# Patient Record
Sex: FEMALE | URBAN - METROPOLITAN AREA
[De-identification: names, ages, dates, MRNs, and addresses within clinical notes are randomized per-mention and may not be internally consistent; named-entity substitution may affect disease eponyms.]

---

## 2020-08-13 ENCOUNTER — APPOINTMENT (RX ONLY)
Dept: URBAN - METROPOLITAN AREA CLINIC 12 | Facility: CLINIC | Age: 35
Setting detail: DERMATOLOGY
End: 2020-08-13

## 2020-08-13 VITALS — TEMPERATURE: 97.9 F

## 2020-08-13 DIAGNOSIS — L98.8 OTHER SPECIFIED DISORDERS OF THE SKIN AND SUBCUTANEOUS TISSUE: ICD-10-CM

## 2020-08-13 PROCEDURE — ? BOTOX

## 2020-08-13 NOTE — PROCEDURE: BOTOX
Expiration Date (Month Year): 04/2023
Right Periorbital Units: 0
Show Nasal Units: Yes
Additional Area 2 Location: Chin
Additional Area 6 Location: [facility]
Forehead Units: 8
Additional Area 4 Location: nasalis
Show Lcl Units: No
Price (Use Numbers Only, No Special Characters Or $): 192.00
Additional Area 1 Location: Lateral eyebrows
consent obtained. Risks include but not limited to lid/brow ptosis, bruising, swelling, diplopia, temporary effect, incomplete chemical denervation.
Glabellar Complex Units: 16
Additional Area 3 Location: lips
Dilution (U/0.1 Cc): 2.5
Lot #: [id number]
Reconstitution Date (Optional): 08/13/2020
Post-Care Instructions: Patient instructed to not lie down for 4 hours and limit physical activity for 24 hours. Patient instructed not to travel by airplane for 48 hours.\\n\\n8 per unit (friends and family discount) x 24 = $192.00
Detail Level: Simple

## 2021-08-31 ENCOUNTER — APPOINTMENT (RX ONLY)
Dept: URBAN - METROPOLITAN AREA CLINIC 12 | Facility: CLINIC | Age: 36
Setting detail: DERMATOLOGY
End: 2021-08-31

## 2021-08-31 PROBLEM — N63.0 UNSPECIFIED LUMP IN UNSPECIFIED BREAST: Status: ACTIVE | Noted: 2021-08-31

## 2021-08-31 PROCEDURE — ? ORDER ULTRASOUND

## 2021-08-31 NOTE — PROCEDURE: ORDER ULTRASOUND
Detail Level: Detailed
Ultrasound Protocol: Ultrasound of Subcutaneous Mass
Lesion Location: left lateral breast
Subcutaneous Lesion Ultrasound Reason: New subcutaneous breast mass
Provider: Bee Straley, NP
Priority: STAT

## 2022-07-13 ENCOUNTER — APPOINTMENT (RX ONLY)
Dept: URBAN - METROPOLITAN AREA CLINIC 93 | Facility: CLINIC | Age: 37
Setting detail: DERMATOLOGY
End: 2022-07-13

## 2022-07-13 DIAGNOSIS — L98.8 OTHER SPECIFIED DISORDERS OF THE SKIN AND SUBCUTANEOUS TISSUE: ICD-10-CM

## 2022-07-13 PROCEDURE — ? BOTOX

## 2022-07-13 ASSESSMENT — LOCATION SIMPLE DESCRIPTION DERM: LOCATION SIMPLE: SUPERIOR FOREHEAD

## 2022-07-13 ASSESSMENT — LOCATION ZONE DERM: LOCATION ZONE: FACE

## 2022-07-13 ASSESSMENT — LOCATION DETAILED DESCRIPTION DERM: LOCATION DETAILED: SUPERIOR MID FOREHEAD

## 2022-07-13 NOTE — PROCEDURE: BOTOX
Additional Area 1 Location: Lateral eyebrows
Show Glabellar Units: Yes
Glabellar Complex Units: 14
R Brow Units: 0
Show Lcl Units: No
Expiration Date (Month Year): 03/2024
Additional Area 6 Location: [facility]
Additional Area 1 Units: 1
Price (Use Numbers Only, No Special Characters Or $): 217
Inferior Lateral Orbicularis Oculi Units: 8
Post-Care Instructions: Patient instructed to not lie down for 4 hours and limit physical activity for 24 hours. Patient instructed not to travel by airplane for 48 hours.
Detail Level: Simple
Additional Area 2 Location: Chin
consent obtained. Risks include but not limited to lid/brow ptosis, bruising, swelling, diplopia, temporary effect, incomplete chemical denervation.
Lot #: [id number]
Dilution (U/0.1 Cc): 4
Additional Area 3 Location: lips
Reconstitution Date (Optional): 07/13/22
Additional Area 4 Location: nasalis
Patient Specific Comments (Will Not Stick From Patient To Patient): Friends and Family discount $7/ unit